# Patient Record
Sex: FEMALE | Race: WHITE | HISPANIC OR LATINO
[De-identification: names, ages, dates, MRNs, and addresses within clinical notes are randomized per-mention and may not be internally consistent; named-entity substitution may affect disease eponyms.]

---

## 2018-04-26 ENCOUNTER — APPOINTMENT (OUTPATIENT)
Dept: SURGERY | Facility: CLINIC | Age: 58
End: 2018-04-26
Payer: MEDICAID

## 2018-04-26 VITALS
HEART RATE: 65 BPM | BODY MASS INDEX: 28.99 KG/M2 | TEMPERATURE: 98.6 F | WEIGHT: 174 LBS | DIASTOLIC BLOOD PRESSURE: 71 MMHG | SYSTOLIC BLOOD PRESSURE: 120 MMHG | HEIGHT: 65 IN | OXYGEN SATURATION: 97 %

## 2018-04-26 VITALS — BODY MASS INDEX: 29.45 KG/M2 | WEIGHT: 177 LBS

## 2018-04-26 PROBLEM — Z00.00 ENCOUNTER FOR PREVENTIVE HEALTH EXAMINATION: Status: ACTIVE | Noted: 2018-04-26

## 2018-04-26 PROCEDURE — 99203 OFFICE O/P NEW LOW 30 MIN: CPT

## 2018-05-04 ENCOUNTER — OUTPATIENT (OUTPATIENT)
Dept: OUTPATIENT SERVICES | Facility: HOSPITAL | Age: 58
LOS: 1 days | End: 2018-05-04
Payer: MEDICAID

## 2018-05-04 VITALS
OXYGEN SATURATION: 99 % | RESPIRATION RATE: 18 BRPM | HEIGHT: 65 IN | TEMPERATURE: 99 F | HEART RATE: 56 BPM | DIASTOLIC BLOOD PRESSURE: 75 MMHG | SYSTOLIC BLOOD PRESSURE: 144 MMHG | WEIGHT: 169.98 LBS

## 2018-05-04 DIAGNOSIS — Z98.51 TUBAL LIGATION STATUS: Chronic | ICD-10-CM

## 2018-05-04 DIAGNOSIS — Z90.710 ACQUIRED ABSENCE OF BOTH CERVIX AND UTERUS: Chronic | ICD-10-CM

## 2018-05-04 DIAGNOSIS — I10 ESSENTIAL (PRIMARY) HYPERTENSION: ICD-10-CM

## 2018-05-04 DIAGNOSIS — K81.9 CHOLECYSTITIS, UNSPECIFIED: ICD-10-CM

## 2018-05-04 DIAGNOSIS — Z01.818 ENCOUNTER FOR OTHER PREPROCEDURAL EXAMINATION: ICD-10-CM

## 2018-05-04 DIAGNOSIS — Z98.41 CATARACT EXTRACTION STATUS, RIGHT EYE: Chronic | ICD-10-CM

## 2018-05-04 DIAGNOSIS — K80.20 CALCULUS OF GALLBLADDER WITHOUT CHOLECYSTITIS WITHOUT OBSTRUCTION: ICD-10-CM

## 2018-05-04 LAB — BLD GP AB SCN SERPL QL: SIGNIFICANT CHANGE UP

## 2018-05-04 PROCEDURE — 86850 RBC ANTIBODY SCREEN: CPT

## 2018-05-04 PROCEDURE — G0463: CPT

## 2018-05-04 PROCEDURE — 86900 BLOOD TYPING SEROLOGIC ABO: CPT

## 2018-05-04 PROCEDURE — 86901 BLOOD TYPING SEROLOGIC RH(D): CPT

## 2018-05-04 RX ORDER — SODIUM CHLORIDE 9 MG/ML
3 INJECTION INTRAMUSCULAR; INTRAVENOUS; SUBCUTANEOUS EVERY 8 HOURS
Qty: 0 | Refills: 0 | Status: DISCONTINUED | OUTPATIENT
Start: 2018-05-09 | End: 2018-05-17

## 2018-05-04 NOTE — H&P PST ADULT - HISTORY OF PRESENT ILLNESS
58 yr old female presents with c/o intermittent abdominal pain due to gallstones. 58 yr old female with PMH of HTN presents with c/o intermittent abdominal pain due to gallstones. Pt for laparoscopic cholecystectomy with intraoperative cholangiogram, possible open on 5/9/2018.

## 2018-05-04 NOTE — H&P PST ADULT - ASSESSMENT
58 yr old female with PMH of HTN presents with cholelithiasis. Pt for laparoscopic cholecystectomy with intraoperative cholangiogram, possible open on 5/9/2018. Pt is at low risk for procedure.

## 2018-05-04 NOTE — H&P PST ADULT - NSANTHOSAYNRD_GEN_A_CORE
No. TIARRA screening performed.  STOP BANG Legend: 0-2 = LOW Risk; 3-4 = INTERMEDIATE Risk; 5-8 = HIGH Risk

## 2018-05-04 NOTE — H&P PST ADULT - FAMILY HISTORY
Father  Still living? No  Family history of MI (myocardial infarction), Age at diagnosis: Age Unknown     Mother  Still living? No  Family history of pneumonia, Age at diagnosis: Age Unknown  Family history of diabetes mellitus (DM), Age at diagnosis: Age Unknown     Sibling  Still living? Yes, Estimated age: Age Unknown  Family history of breast cancer in sister, Age at diagnosis: Age Unknown  Family history of diabetes mellitus (DM), Age at diagnosis: Age Unknown

## 2018-05-04 NOTE — H&P PST ADULT - PROBLEM SELECTOR PLAN 2
Continue Olmesartan and take medication with sips of water on day of surgery preop. Follow-up with PCP postop for BP management

## 2018-05-04 NOTE — H&P PST ADULT - NEGATIVE CARDIOVASCULAR SYMPTOMS
no orthopnea/no claudication/no chest pain/no paroxysmal nocturnal dyspnea/no peripheral edema/no dyspnea on exertion/no palpitations

## 2018-05-04 NOTE — H&P PST ADULT - RS GEN PE MLT RESP DETAILS PC
no rales/good air movement/no chest wall tenderness/no intercostal retractions/clear to auscultation bilaterally/respirations non-labored/no rhonchi/no subcutaneous emphysema/normal/airway patent/breath sounds equal/no wheezes

## 2018-05-04 NOTE — H&P PST ADULT - AIRWAY
INSURANCE COVERAGE REGARDING PAYMENT  FOR YOUR COLONOSCOPY      Colon Cancer is the second leading cause of death among cancers, per the [employer]. It is preventable. Early detection is the key. Your doctor will determine which tests need to be done for prevention and/or treatment.    If during the course of a screening colonoscopy, our physician finds an abnormality, performs a biopsy or polypectomy (removal of polyp), your insurance company may consider the procedure to be a diagnostic exam and no longer a screening procedure.    Every insurance company is different. We encourage you to call your insurance company and ask them \"if during the course of a screening colonoscopy, an abnormality is discovered and the physician performs a biopsy or polypectomy, will the procedure fall under your screening benefits or under diagnostic benefits\". Generally, screening benefits and diagnostic benefits are paid at different levels. This varies with each insurance company, so we want you to be aware of this prior to your procedure. You do not have to call your insurance company if you have Medicare.    The authorization staff at [facility] [place] will precertify your colonoscopy. However, precertification, which serves as notification is never a guarantee of payment. If you have questions regarding precertification for your procedure please contact your insurance company. If you need further assistance call our authorization department at [phone].   normal

## 2018-05-04 NOTE — H&P PST ADULT - GASTROINTESTINAL DETAILS
no distention/soft/normal/no guarding/bowel sounds normal/no rebound tenderness/no masses palpable/no bruit

## 2018-05-04 NOTE — H&P PST ADULT - PSH
History of bilateral tubal ligation    History of robot-assisted laparoscopic hysterectomy History of bilateral tubal ligation    History of right cataract extraction    History of robot-assisted laparoscopic hysterectomy

## 2018-05-08 ENCOUNTER — TRANSCRIPTION ENCOUNTER (OUTPATIENT)
Age: 58
End: 2018-05-08

## 2018-05-09 ENCOUNTER — OUTPATIENT (OUTPATIENT)
Dept: OUTPATIENT SERVICES | Facility: HOSPITAL | Age: 58
LOS: 1 days | End: 2018-05-09
Payer: MEDICAID

## 2018-05-09 ENCOUNTER — RESULT REVIEW (OUTPATIENT)
Age: 58
End: 2018-05-09

## 2018-05-09 VITALS
HEART RATE: 56 BPM | TEMPERATURE: 98 F | DIASTOLIC BLOOD PRESSURE: 50 MMHG | SYSTOLIC BLOOD PRESSURE: 110 MMHG | RESPIRATION RATE: 16 BRPM | OXYGEN SATURATION: 100 %

## 2018-05-09 VITALS
SYSTOLIC BLOOD PRESSURE: 119 MMHG | HEIGHT: 65 IN | OXYGEN SATURATION: 100 % | TEMPERATURE: 98 F | WEIGHT: 169.98 LBS | RESPIRATION RATE: 16 BRPM | DIASTOLIC BLOOD PRESSURE: 58 MMHG | HEART RATE: 58 BPM

## 2018-05-09 DIAGNOSIS — Z01.818 ENCOUNTER FOR OTHER PREPROCEDURAL EXAMINATION: ICD-10-CM

## 2018-05-09 DIAGNOSIS — Z98.51 TUBAL LIGATION STATUS: Chronic | ICD-10-CM

## 2018-05-09 DIAGNOSIS — Z90.710 ACQUIRED ABSENCE OF BOTH CERVIX AND UTERUS: Chronic | ICD-10-CM

## 2018-05-09 DIAGNOSIS — K81.9 CHOLECYSTITIS, UNSPECIFIED: ICD-10-CM

## 2018-05-09 DIAGNOSIS — Z98.41 CATARACT EXTRACTION STATUS, RIGHT EYE: Chronic | ICD-10-CM

## 2018-05-09 PROCEDURE — 47563 LAPARO CHOLECYSTECTOMY/GRAPH: CPT

## 2018-05-09 PROCEDURE — 47563 LAPARO CHOLECYSTECTOMY/GRAPH: CPT | Mod: AS

## 2018-05-09 PROCEDURE — 88304 TISSUE EXAM BY PATHOLOGIST: CPT

## 2018-05-09 PROCEDURE — 76000 FLUOROSCOPY <1 HR PHYS/QHP: CPT

## 2018-05-09 PROCEDURE — 86850 RBC ANTIBODY SCREEN: CPT

## 2018-05-09 PROCEDURE — 88304 TISSUE EXAM BY PATHOLOGIST: CPT | Mod: 26

## 2018-05-09 PROCEDURE — 86900 BLOOD TYPING SEROLOGIC ABO: CPT

## 2018-05-09 PROCEDURE — 86901 BLOOD TYPING SEROLOGIC RH(D): CPT

## 2018-05-09 RX ORDER — ONDANSETRON 8 MG/1
4 TABLET, FILM COATED ORAL ONCE
Qty: 0 | Refills: 0 | Status: COMPLETED | OUTPATIENT
Start: 2018-05-09 | End: 2018-05-09

## 2018-05-09 RX ORDER — HYDROMORPHONE HYDROCHLORIDE 2 MG/ML
0.5 INJECTION INTRAMUSCULAR; INTRAVENOUS; SUBCUTANEOUS
Qty: 0 | Refills: 0 | Status: DISCONTINUED | OUTPATIENT
Start: 2018-05-09 | End: 2018-05-09

## 2018-05-09 RX ORDER — OXYCODONE AND ACETAMINOPHEN 5; 325 MG/1; MG/1
1 TABLET ORAL EVERY 6 HOURS
Qty: 0 | Refills: 0 | Status: DISCONTINUED | OUTPATIENT
Start: 2018-05-09 | End: 2018-05-09

## 2018-05-09 RX ORDER — ACETAMINOPHEN 500 MG
1000 TABLET ORAL ONCE
Qty: 0 | Refills: 0 | Status: COMPLETED | OUTPATIENT
Start: 2018-05-09 | End: 2018-05-09

## 2018-05-09 RX ORDER — ONDANSETRON 8 MG/1
4 TABLET, FILM COATED ORAL EVERY 6 HOURS
Qty: 0 | Refills: 0 | Status: DISCONTINUED | OUTPATIENT
Start: 2018-05-09 | End: 2018-05-17

## 2018-05-09 RX ORDER — ACETAMINOPHEN 500 MG
2 TABLET ORAL
Qty: 0 | Refills: 0 | COMMUNITY

## 2018-05-09 RX ORDER — OLMESARTAN MEDOXOMIL 5 MG/1
1 TABLET, FILM COATED ORAL
Qty: 0 | Refills: 0 | COMMUNITY

## 2018-05-09 RX ADMIN — Medication 400 MILLIGRAM(S): at 13:14

## 2018-05-09 RX ADMIN — SODIUM CHLORIDE 3 MILLILITER(S): 9 INJECTION INTRAMUSCULAR; INTRAVENOUS; SUBCUTANEOUS at 10:02

## 2018-05-09 RX ADMIN — ONDANSETRON 4 MILLIGRAM(S): 8 TABLET, FILM COATED ORAL at 17:47

## 2018-05-09 RX ADMIN — Medication 1000 MILLIGRAM(S): at 13:29

## 2018-05-09 RX ADMIN — HYDROMORPHONE HYDROCHLORIDE 0.5 MILLIGRAM(S): 2 INJECTION INTRAMUSCULAR; INTRAVENOUS; SUBCUTANEOUS at 13:44

## 2018-05-09 RX ADMIN — HYDROMORPHONE HYDROCHLORIDE 0.5 MILLIGRAM(S): 2 INJECTION INTRAMUSCULAR; INTRAVENOUS; SUBCUTANEOUS at 13:29

## 2018-05-09 NOTE — ASU PATIENT PROFILE, ADULT - PSH
History of bilateral tubal ligation    History of right cataract extraction    History of robot-assisted laparoscopic hysterectomy

## 2018-05-09 NOTE — BRIEF OPERATIVE NOTE - PROCEDURE
<<-----Click on this checkbox to enter Procedure Cholecystectomy, laparoscopic, with cholangiogram  05/09/2018    [facility]

## 2018-05-09 NOTE — ASU DISCHARGE PLAN (ADULT/PEDIATRIC). - MEDICATION SUMMARY - MEDICATIONS TO TAKE
I will START or STAY ON the medications listed below when I get home from the hospital:    oxyCODONE-acetaminophen 5 mg-325 mg oral tablet  -- 1 tab(s) by mouth every 6 hours, As Needed -for moderate pain MDD:4 tablets   -- Caution federal law prohibits the transfer of this drug to any person other  than the person for whom it was prescribed.  May cause drowsiness.  Alcohol may intensify this effect.  Use care when operating dangerous machinery.  This prescription cannot be refilled.  This product contains acetaminophen.  Do not use  with any other product containing acetaminophen to prevent possible liver damage.  Using more of this medication than prescribed may cause serious breathing problems.    -- Indication: For Cholecystitis    Tylenol 325 mg oral tablet  -- 2 tab(s) by mouth every 4 hours, As Needed  -- Indication: For pain    olmesartan 40 mg oral tablet  -- 1 tab(s) by mouth once a day  -- Indication: For hypertension    hydroCHLOROthiazide 12.5 mg oral tablet  -- 1 tab(s) by mouth once a day  -- Indication: For hypertension

## 2018-05-09 NOTE — ASU DISCHARGE PLAN (ADULT/PEDIATRIC). - INSTRUCTIONS
Eat low-fat foods for 4-6 weeks while your body adjusts to digesting fat without a gallbladder. Slowly increase the amount of fat that you eat. Drink plenty of liquids. Ask how much liquids to drink and which liquids are best for you.

## 2018-05-09 NOTE — BRIEF OPERATIVE NOTE - POST-OP DX
Calculus of gallbladder with chronic cholecystitis without obstruction  05/09/2018    Active  Ernestina Monsalve

## 2018-05-14 LAB — SURGICAL PATHOLOGY FINAL REPORT - CH: SIGNIFICANT CHANGE UP

## 2018-05-22 PROBLEM — Z82.49 FAMILY HISTORY OF MYOCARDIAL INFARCTION: Status: ACTIVE | Noted: 2018-04-26

## 2018-05-22 PROBLEM — Z86.79 HISTORY OF ESSENTIAL HYPERTENSION: Status: RESOLVED | Noted: 2018-05-22 | Resolved: 2018-05-22

## 2018-05-22 PROBLEM — Z87.19 HISTORY OF CHOLECYSTITIS: Status: RESOLVED | Noted: 2018-05-22 | Resolved: 2018-05-22

## 2018-05-22 PROBLEM — Z87.19 HISTORY OF CHOLELITHIASIS: Status: RESOLVED | Noted: 2018-05-22 | Resolved: 2018-05-22

## 2018-05-22 PROBLEM — Z80.9 FAMILY HISTORY OF MALIGNANT NEOPLASM: Status: ACTIVE | Noted: 2018-04-26

## 2018-05-24 ENCOUNTER — APPOINTMENT (OUTPATIENT)
Dept: SURGERY | Facility: CLINIC | Age: 58
End: 2018-05-24
Payer: MEDICAID

## 2018-05-24 DIAGNOSIS — Z86.79 PERSONAL HISTORY OF OTHER DISEASES OF THE CIRCULATORY SYSTEM: ICD-10-CM

## 2018-05-24 DIAGNOSIS — Z80.9 FAMILY HISTORY OF MALIGNANT NEOPLASM, UNSPECIFIED: ICD-10-CM

## 2018-05-24 DIAGNOSIS — Z87.19 PERSONAL HISTORY OF OTHER DISEASES OF THE DIGESTIVE SYSTEM: ICD-10-CM

## 2018-05-24 DIAGNOSIS — Z82.49 FAMILY HISTORY OF ISCHEMIC HEART DISEASE AND OTHER DISEASES OF THE CIRCULATORY SYSTEM: ICD-10-CM

## 2018-05-24 PROCEDURE — 99024 POSTOP FOLLOW-UP VISIT: CPT

## 2020-08-05 NOTE — H&P PST ADULT - NSWEIGHTCALCTOOLDRUG_GEN_A_CORE
My findings and recommendations are based on patient's symptoms, eye exam, diagnostic testing, and records.  used

## 2021-07-02 NOTE — ASU PATIENT PROFILE, ADULT - ANESTHESIA, PREVIOUS REACTION, PROFILE
PHYSICAL THERAPY HIP TREATMENT NOTE - INPATIENT    Room Number: 432/432-A            Presenting Problem: L ANGELINE    Problem List  Active Problems:    Hyperlipemia, mixed    Panlobular emphysema (HCC)    S/P hip replacement, left    Preop testing      PHYSICA -   Moving from lying on back to sitting on the side of the bed?: A Little   How much help from another person does the patient currently need. ..   -   Moving to and from a bed to a chair (including a wheelchair)?: A Little   -   Need to walk in hospital ROM/strengthening per the instructions provided in preparation for discharge.    Goal #6  Current Status In progress none

## 2022-10-10 NOTE — H&P PST ADULT - PROBLEM SELECTOR PROBLEM 2
HTN (hypertension) Follicular Atypia Histology Text: Follicular atypia was noted and reviewed with patient, patient was advised to monitor and report any skin changes.

## 2025-04-21 NOTE — ASU DISCHARGE PLAN (ADULT/PEDIATRIC). - ACTIVITY LEVEL
"Verbal consent was acquired by the patient to use [employer] ambient listening note generation during this visit.    Subjective:     HPI:   History of Present Illness  The patient presents for evaluation of knee pain.    She reports a 90% improvement in her knee condition. She has been under the care of Dr. Vinson, who initially diagnosed her with a stress fracture based on MRI findings. However, subsequent evaluation revealed the primary etiology to be degenerative joint disease with associated fraying of the articular cartilage. Dr. Vinson offered intra-articular corticosteroid or lidocaine injections as treatment options. The patient perceives the pain as a beneficial indicator, preventing overexertion. She has not yet initiated physical therapy but expresses a desire to resume exercise. She has been advised against treadmill use and has not experienced any episodes of knee instability or giving way. She maintains balance by standing on one leg while brushing her teeth and has not resumed her [employer] fitness classes since the onset of her symptoms. She is considering focusing on TRX exercises and improving her form before increasing weight resistance. She has historically struggled with lunges due to an inability to maintain proper alignment, resulting in collapse. She is contemplating incorporating swimming into her exercise regimen and has previously attempted reformer Pilates but found it challenging due to her height. She also engages in mat Pilates at home.    Health Maintenance: Completed    Objective:     Exam:  /64 (BP Location: Right arm, Patient Position: Sitting, BP Cuff Size: Adult)   Pulse 62   Temp 36.6 °C (97.8 °F) (Temporal)   Ht 1.778 m (5' 10\")   Wt 76.7 kg (169 lb)   SpO2 93%   BMI 24.25 kg/m²  Body mass index is 24.25 kg/m².    Physical Exam  Vitals reviewed.   Constitutional:       Appearance: Normal appearance.   HENT:      Head: Normocephalic and atraumatic.      Right Ear: " External ear normal.      Left Ear: External ear normal.      Nose: Nose normal.   Cardiovascular:      Rate and Rhythm: Normal rate and regular rhythm.      Pulses: Normal pulses.      Heart sounds: Normal heart sounds. No murmur heard.  Pulmonary:      Effort: Pulmonary effort is normal. No respiratory distress.      Breath sounds: Normal breath sounds. No wheezing.   Abdominal:      General: Abdomen is flat.      Palpations: Abdomen is soft.   Skin:     General: Skin is warm and dry.   Neurological:      Mental Status: She is alert and oriented to person, place, and time.   Psychiatric:         Mood and Affect: Mood normal.         Behavior: Behavior normal.       General: The patient is awake, alert, oriented. Dressed appropriately with good eye contact. No acute distress.   Examination of the RIGHT knee: Tenderness to palpation of the medial joint line. Minimal ttp lateral joint line.  NO tenderness to the medial or lateral tibial plateau. NO tenderness to the medial or lateral femoral condyles. + to the medial and lateral patella facets. 5/5 strength of the extensor mechanism. + Orin's sign. - Lachman's. - Anterior/Posterior drawer. - Varus/Valgus stress. There is no swelling. Sensation is grossly intact.  2+ DP pulse.   Hip: No pain with a log roll or gentle internal, external, flexion forces of the hip.   Skin: No warmth, redness, heat or rashes. Warm, dry.     TTP along the mcl and medial tibial plateau        Results  Details    Reading Physician Reading Date Result Priority   Vickey Gaston M.D.  [phone]     3/6/2025      Narrative & Impression     3/6/2025 11:14 AM     HISTORY/REASON FOR EXAM:  Right knee pain.     TECHNIQUE/EXAM DESCRIPTION AND NUMBER OF VIEWS: 4 views of the RIGHT knee.     COMPARISON: None     FINDINGS:  Bone density is normal. There is no evidence of fracture or dislocation. There is osteoarthritis of the sella femoral and medial femorotibial articulations characterized by  osteophytic spurring. There is an intra-articular loose body within the joint   space posteriorly. There is no joint effusion.     IMPRESSION:     1.  No evidence of acute fracture or dislocation.     2.  Osteoarthritis of the patellofemoral and medial femorotibial joints.     3.  Intra-articular loose body.        Exam Ended: 03/06/25 11:27 AM Last Resulted: 03/06/25 11:31 AM           MR-KNEE-W/O RIGHT  Order: [id number]   Status: Final result       Next appt: None       Dx: Loose body of right knee; Injury of r...    Test Result Released: Yes (seen)    1 Result Note       View Follow-Up Encounter  Details    Reading Physician Reading Date Result Priority   Derrick Meneses M.D.  [phone]     3/23/2025      Narrative & Impression     3/23/2025 7:17 AM     HISTORY/REASON FOR EXAM:  loose body, atraumatic pain and swelling        TECHNIQUE/EXAM DESCRIPTION:  MRI of the RIGHT knee without contrast.     The study was performed on a [employer] 1.5 Briseida MRI scanner. T1 coronal, proton density fat-suppressed coronal, fast spin-echo T2 fat-suppressed axial, intermediate fast spin-echo sagittal, and intermediate fast spin-echo fat-suppressed thin-section   sagittal images were obtained.     COMPARISON: Radiographs dated 3/6/2025     FINDINGS:  There is a small joint effusion with a 12 mm intra-articular body posteriorly. There is also a small to moderate-sized Baker's cyst.     Anterior and posterior cruciate ligaments are intact. There is edema surrounding the medial collateral ligament. As there is no history of injury, this may be related to the underlying medial compartment pathology rather than a medial collateral sprain.   The lateral collateral ligament complex is intact. The quadriceps mechanism, patellar tendon, and iliotibial band are also intact.     There is a small undersurface tear of the posterior horn of the medial meniscus best appreciated on coronal image #16. There may be mild fraying of the free edge  of the lateral meniscus.     There is tricompartmental chondromalacia and chondral irregularity with small osteophytes. There is curvilinear low T1 weighted signal within the medial tibial plateau consistent with an intramedullary fracture. This measures approximately 13 mm in   transverse dimension and 13 mm in anteroposterior dimension.     There is edema of the superolateral aspect of Hoffa's fat pad anterior to the lateral femoral condyle. This may be seen with fat pad impingement.     IMPRESSION:        1. Small joint effusion and intra-articular body.  2. Small to moderate-sized Baker's cyst.  3. Possible sprain of the medial collateral ligament.  4. Small undersurface tear of the posterior horn of the medial meniscus with possible fraying of the free edge of the lateral meniscus.  5. Tricompartmental osteoarthrosis.  6. Small stress or insufficiency fracture of the medial tibial plateau.        Exam Ended: 03/23/25  7:39 AM Last Resulted: 03/23/25  3:26 PM             Imaging   - MRI of the knee: Subchondral sclerosis, evidence of bone on bone irritation, a little tear in the medial meniscus, and some cartilage wear.   - X-ray of the knee: Spurs and less space on the inside of the knee compared to the outside.    Assessment & Plan:     1. Encounter for screening mammogram for malignant neoplasm of breast  MA-SCREENING MAMMO BILAT W/TOMOSYNTHESIS W/CAD      2. Acute pain of right knee        3. Primary osteoarthritis of right knee        4. Acute medial meniscus tear of right knee, initial encounter            Assessment & Plan  1. Knee pain.  - The patient's knee pain is likely due to a combination of factors including valgus stress, potential irritation of the MCL ligament without tearing, a minor meniscus tear possibly from arthritis or impact, and bone bruising.  - The absence of mechanical instability is a positive sign. The insufficiency fracture observed on the MRI is indicative of subchondral  sclerosis, resulting from bone-on-bone articulation.  - The MRI findings were thoroughly reviewed and discussed with the patient. The presence of spurs on the x-ray suggests some degree of knee deformity, which is common in cases of arthritis. The patient was reassured that there is no need for immediate concern as long as her condition continues to improve.  - She was advised to avoid running and jumping exercises, and to focus on non-impact activities such as swimming, cycling, and Pilates. She was also encouraged to perform wall sits at home for a few seconds at a time, ensuring proper mechanics to prevent the knees from collapsing inward. The patient was informed that a bone contusion can persist for some time and that stress fractures or deep bone bruises may require 6 to 8 weeks, or sometimes 2 to 3 months, to heal. The patient was advised to monitor for symptoms such as knee buckling, instability, catching, or locking, which could indicate a need for surgical intervention.        Return if symptoms worsen or fail to improve.    Please note that this dictation was created using voice recognition software. I have made every reasonable attempt to correct obvious errors, but I expect that there are errors of grammar and possibly content that I did not discover before finalizing the note.    Ying Martínez MD  Family Medicine and Non - Operative Sports Medicine   [facility]       2 weeks/no heavy lifting
